# Patient Record
Sex: MALE | Race: WHITE | NOT HISPANIC OR LATINO | Employment: UNEMPLOYED | URBAN - METROPOLITAN AREA
[De-identification: names, ages, dates, MRNs, and addresses within clinical notes are randomized per-mention and may not be internally consistent; named-entity substitution may affect disease eponyms.]

---

## 2024-04-11 ENCOUNTER — HOSPITAL ENCOUNTER (EMERGENCY)
Facility: HOSPITAL | Age: 44
Discharge: HOME | End: 2024-04-12
Attending: EMERGENCY MEDICINE

## 2024-04-11 VITALS
OXYGEN SATURATION: 98 % | DIASTOLIC BLOOD PRESSURE: 84 MMHG | HEART RATE: 85 BPM | RESPIRATION RATE: 18 BRPM | SYSTOLIC BLOOD PRESSURE: 149 MMHG | TEMPERATURE: 98.1 F

## 2024-04-11 DIAGNOSIS — F30.10 MANIC BEHAVIOR (HCC): Primary | ICD-10-CM

## 2024-04-11 PROCEDURE — 99283 EMERGENCY DEPT VISIT LOW MDM: CPT

## 2024-04-12 LAB
ALBUMIN SERPL BCP-MCNC: 4.2 G/DL (ref 3.5–5)
ALP SERPL-CCNC: 40 U/L (ref 34–104)
ALT SERPL W P-5'-P-CCNC: 11 U/L (ref 7–52)
AMPHETAMINES SERPL QL SCN: NEGATIVE
ANION GAP SERPL CALCULATED.3IONS-SCNC: 10 MMOL/L (ref 4–13)
AST SERPL W P-5'-P-CCNC: 17 U/L (ref 13–39)
BARBITURATES UR QL: NEGATIVE
BASOPHILS # BLD AUTO: 0.04 THOUSANDS/ÂΜL (ref 0–0.1)
BASOPHILS NFR BLD AUTO: 0 % (ref 0–1)
BENZODIAZ UR QL: NEGATIVE
BILIRUB SERPL-MCNC: 0.44 MG/DL (ref 0.2–1)
BUN SERPL-MCNC: 14 MG/DL (ref 5–25)
CALCIUM SERPL-MCNC: 9 MG/DL (ref 8.4–10.2)
CHLORIDE SERPL-SCNC: 106 MMOL/L (ref 96–108)
CO2 SERPL-SCNC: 24 MMOL/L (ref 21–32)
COCAINE UR QL: NEGATIVE
CREAT SERPL-MCNC: 0.67 MG/DL (ref 0.6–1.3)
EOSINOPHIL # BLD AUTO: 0.19 THOUSAND/ÂΜL (ref 0–0.61)
EOSINOPHIL NFR BLD AUTO: 2 % (ref 0–6)
ERYTHROCYTE [DISTWIDTH] IN BLOOD BY AUTOMATED COUNT: 12.3 % (ref 11.6–15.1)
ETHANOL SERPL-MCNC: <10 MG/DL
FENTANYL UR QL SCN: NEGATIVE
FLUAV RNA RESP QL NAA+PROBE: NEGATIVE
FLUBV RNA RESP QL NAA+PROBE: NEGATIVE
GFR SERPL CREATININE-BSD FRML MDRD: 116 ML/MIN/1.73SQ M
GLUCOSE SERPL-MCNC: 86 MG/DL (ref 65–140)
HCT VFR BLD AUTO: 40.9 % (ref 36.5–49.3)
HGB BLD-MCNC: 13.1 G/DL (ref 12–17)
HYDROCODONE UR QL SCN: NEGATIVE
IMM GRANULOCYTES # BLD AUTO: 0.07 THOUSAND/UL (ref 0–0.2)
IMM GRANULOCYTES NFR BLD AUTO: 1 % (ref 0–2)
LYMPHOCYTES # BLD AUTO: 4.19 THOUSANDS/ÂΜL (ref 0.6–4.47)
LYMPHOCYTES NFR BLD AUTO: 38 % (ref 14–44)
MCH RBC QN AUTO: 29.3 PG (ref 26.8–34.3)
MCHC RBC AUTO-ENTMCNC: 32 G/DL (ref 31.4–37.4)
MCV RBC AUTO: 92 FL (ref 82–98)
METHADONE UR QL: NEGATIVE
MONOCYTES # BLD AUTO: 1.17 THOUSAND/ÂΜL (ref 0.17–1.22)
MONOCYTES NFR BLD AUTO: 11 % (ref 4–12)
NEUTROPHILS # BLD AUTO: 5.39 THOUSANDS/ÂΜL (ref 1.85–7.62)
NEUTS SEG NFR BLD AUTO: 48 % (ref 43–75)
NRBC BLD AUTO-RTO: 0 /100 WBCS
OPIATES UR QL SCN: NEGATIVE
OXYCODONE+OXYMORPHONE UR QL SCN: NEGATIVE
PCP UR QL: NEGATIVE
PLATELET # BLD AUTO: 264 THOUSANDS/UL (ref 149–390)
PMV BLD AUTO: 11 FL (ref 8.9–12.7)
POTASSIUM SERPL-SCNC: 3.9 MMOL/L (ref 3.5–5.3)
PROT SERPL-MCNC: 6.9 G/DL (ref 6.4–8.4)
RBC # BLD AUTO: 4.47 MILLION/UL (ref 3.88–5.62)
RSV RNA RESP QL NAA+PROBE: NEGATIVE
SARS-COV-2 RNA RESP QL NAA+PROBE: NEGATIVE
SODIUM SERPL-SCNC: 140 MMOL/L (ref 135–147)
THC UR QL: POSITIVE
TSH SERPL DL<=0.05 MIU/L-ACNC: 1.61 UIU/ML (ref 0.45–4.5)
VALPROATE SERPL-MCNC: 81 UG/ML (ref 50–100)
WBC # BLD AUTO: 11.05 THOUSAND/UL (ref 4.31–10.16)

## 2024-04-12 PROCEDURE — 85025 COMPLETE CBC W/AUTO DIFF WBC: CPT | Performed by: EMERGENCY MEDICINE

## 2024-04-12 PROCEDURE — 80164 ASSAY DIPROPYLACETIC ACD TOT: CPT | Performed by: EMERGENCY MEDICINE

## 2024-04-12 PROCEDURE — 99285 EMERGENCY DEPT VISIT HI MDM: CPT | Performed by: EMERGENCY MEDICINE

## 2024-04-12 PROCEDURE — 82077 ASSAY SPEC XCP UR&BREATH IA: CPT | Performed by: EMERGENCY MEDICINE

## 2024-04-12 PROCEDURE — 0241U HB NFCT DS VIR RESP RNA 4 TRGT: CPT | Performed by: EMERGENCY MEDICINE

## 2024-04-12 PROCEDURE — 80053 COMPREHEN METABOLIC PANEL: CPT | Performed by: EMERGENCY MEDICINE

## 2024-04-12 PROCEDURE — 84443 ASSAY THYROID STIM HORMONE: CPT | Performed by: EMERGENCY MEDICINE

## 2024-04-12 PROCEDURE — 80307 DRUG TEST PRSMV CHEM ANLYZR: CPT | Performed by: EMERGENCY MEDICINE

## 2024-04-12 PROCEDURE — 36415 COLL VENOUS BLD VENIPUNCTURE: CPT | Performed by: EMERGENCY MEDICINE

## 2024-04-12 NOTE — ED NOTES
"Pt presents to ED post acting bizarrely in the community this AM taking off his pants and going outside. Pt stated that he is working to alleviate any thoughts of inappropriateness and shame which was his explanation for the event. Pt friend who accompanied him reported that it was a bit of a bigger deal as they live surrounded by others who might call PD. Pt reports that he has been IP @ 4x due to 302's by ex wife. Pt has been  for 10 years. Pt denies any D&A TX. Pt reports that he is not involved w/ behavioral health [place] but would be open to resources. Pt reported that he does not feel IP is helpful and felt that the previous 4x yielded nothing he would consider a positive change in his life. Pt is tangential and bizarre at some points. Pt does not appear to be responding to internal stimuli. Pt denies AV hallucinations or command/persecutory voices. Friend reports that Pt is paranoid and has thoughts of the government and intrusions by them. Pt believes that he is Dx'd ADHD, PTSD, Autistic and not the Bipolar DO that professionals have \"laid at his door.\" Friend reports \"rapid cycling\" consisting of screaming/crying/laughing all in short order. Pt irritable and guarded when talking about meds and adherence to such; it would appear that the individuals he has resided w/ for the last six months assist w/ this. Pt reports ongoing unemployment. Pt reports no constructive behaviors. When asked to describe any depression/anxiety Pt responds \"varies\" or \"no sale.\" Friend states Pt is depressed and has been for months. Pt's attention span is poor. Pt's judgment/insight into his behaviors is also fair/poor. Pt is disheveled/malodorous appearing considerably older than stated age. Pt denies any SI/HI/criminal transgressions/access to firearms. Pt adamant that he is not interested in IP care unless I can \"guarantee\" he will be listened to which this worker could not. Pt vacillated on IP when friend told him something " has to be done or else they will have to look at other living arrangements. Pt somewhat irritable and minimizing but Pt has the wherewithal to decline IP TX at this point. Consulted w/ ED attending, Pt to be given OP resources and encouraged to f/u w/ ED should behaviors worsen.

## 2024-04-12 NOTE — ED PROVIDER NOTES
"History  Chief Complaint   Patient presents with    Medication Problem     Pt states \"I haven't been taking my medicine because I'm a big kid and I'm playing a game.  I know I need them but I like to play.  I like to fuck around so I can find out and be smarter.\"  Pt friends state that pt has been in a period of jeff lately, and when he is like this he stops taking his medications.     Psychiatric Evaluation     43 year old male presents for evaluation of increasingly erratic behavior.  He states that he has not been taking his medications as prescribed for several years and only restarted his levothyroxine 2 days ago at the encouragement of his friend.  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  He is prescribed Depakote for history of grand mal seizures with last seizure 4 months ago.  He states he takes his Depakote once per day instead of twice daily as prescribed.  Patient is currently homeless, but staying with his friend.  She states that he has not been able to sleep and has difficulty caring for himself.  He reportedly ran out of the house naked and had to be escorted back into the home.  Patient denies SI/HI.      Psychiatric Evaluation      None       Past Medical History:   Diagnosis Date    Epilepsy (HCC)     Thyroid cancer (HCC)        Past Surgical History:   Procedure Laterality Date    THYROIDECTOMY      US GUIDED THYROID BIOPSY  04/11/2018       History reviewed. No pertinent family history.  I have reviewed and agree with the history as documented.    E-Cigarette/Vaping    E-Cigarette Use Never User      E-Cigarette/Vaping Substances     Social History     Tobacco Use    Smoking status: Every Day     Current packs/day: 1.50     Types: Cigarettes    Smokeless tobacco: Never   Vaping Use    Vaping status: Never Used   Substance Use Topics    Alcohol use: Yes     Comment: ocassional    Drug use: Yes     Types: Marijuana     Comment: also " mushrooms       Review of Systems    Physical Exam  Physical Exam  Vitals and nursing note reviewed.   HENT:      Head: Normocephalic and atraumatic.   Eyes:      Conjunctiva/sclera: Conjunctivae normal.   Cardiovascular:      Rate and Rhythm: Normal rate and regular rhythm.   Pulmonary:      Effort: Pulmonary effort is normal. No respiratory distress.   Abdominal:      General: There is no distension.   Musculoskeletal:         General: No deformity.   Skin:     General: Skin is warm and dry.   Neurological:      Mental Status: He is alert.         Vital Signs  ED Triage Vitals [04/11/24 1947]   Temperature Pulse Respirations Blood Pressure SpO2   98.1 °F (36.7 °C) 85 18 149/84 98 %      Temp Source Heart Rate Source Patient Position - Orthostatic VS BP Location FiO2 (%)   Temporal Monitor -- Right arm --      Pain Score       No Pain           Vitals:    04/11/24 1947   BP: 149/84   Pulse: 85         Visual Acuity      ED Medications  Medications - No data to display    Diagnostic Studies  Results Reviewed       Procedure Component Value Units Date/Time    TSH, 3rd generation with Free T4 reflex [[id number]]  (Normal) Collected: 04/12/24 0030    Lab Status: Final result Specimen: Blood from Arm, Left Updated: 04/12/24 0134     TSH 3RD GENERATON 1.614 uIU/mL     FLU/RSV/COVID - if FLU/RSV clinically relevant [[id number]]  (Normal) Collected: 04/12/24 0030    Lab Status: Final result Specimen: Nares from Nose Updated: 04/12/24 0114     SARS-CoV-2 Negative     INFLUENZA A PCR Negative     INFLUENZA B PCR Negative     RSV PCR Negative    Narrative:      FOR PEDIATRIC PATIENTS - copy/paste COVID Guidelines URL to browser: [email or web address]    SARS-CoV-2 assay is a Nucleic Acid Amplification assay intended for the  qualitative detection of nucleic acid from SARS-CoV-2 in nasopharyngeal  swabs. Results are for the presumptive identification of SARS-CoV-2  RNA.    Positive results are indicative of infection with SARS-CoV-2, the virus  causing COVID-19, but do not rule out bacterial infection or co-infection  with other viruses. Laboratories within the [place] and its  territories are required to report all positive results to the appropriate  public health authorities. Negative results do not preclude SARS-CoV-2  infection and should not be used as the sole basis for treatment or other  patient management decisions. Negative results must be combined with  clinical observations, patient history, and epidemiological information.  This test has not been FDA cleared or approved.    This test has been authorized by FDA under an Emergency Use Authorization  (EUA). This test is only authorized for the duration of time the  declaration that circumstances exist justifying the authorization of the  emergency use of an in vitro diagnostic tests for detection of SARS-CoV-2  virus and/or diagnosis of COVID-19 infection under section 564(b)(1) of  the Act, 21 U.S.C. 360bbb-3(b)(1), unless the authorization is terminated  or revoked sooner. The test has been validated but independent review by FDA  and CLIA is pending.    Test performed using [employer] Gene[employer]: This RT-PCR assay targets N2,  a region unique to SARS-CoV-2. A conserved region in the E-gene was chosen  for pan-Sarbecovirus detection which includes SARS-CoV-2.    According to CMS-2020-01-R, this platform meets the definition of high-throughput technology.    Comprehensive metabolic panel [[id number]] Collected: 04/12/24 0030    Lab Status: Final result Specimen: Blood from Arm, Left Updated: 04/12/24 0054     Sodium 140 mmol/L      Potassium 3.9 mmol/L      Chloride 106 mmol/L      CO2 24 mmol/L      ANION GAP 10 mmol/L      BUN 14 mg/dL      Creatinine 0.67 mg/dL      Glucose 86 mg/dL      Calcium 9.0 mg/dL      AST 17 U/L      ALT 11 U/L      Alkaline Phosphatase 40 U/L      Total Protein 6.9 g/dL      Albumin  4.2 g/dL      Total Bilirubin 0.44 mg/dL      eGFR 116 ml/min/1.73sq m     Narrative:      National Kidney Disease Foundation guidelines for Chronic Kidney Disease (CKD):     Stage 1 with normal or high GFR (GFR > 90 mL/min/1.73 square meters)    Stage 2 Mild CKD (GFR = 60-89 mL/min/1.73 square meters)    Stage 3A Moderate CKD (GFR = 45-59 mL/min/1.73 square meters)    Stage 3B Moderate CKD (GFR = 30-44 mL/min/1.73 square meters)    Stage 4 Severe CKD (GFR = 15-29 mL/min/1.73 square meters)    Stage 5 End Stage CKD (GFR <15 mL/min/1.73 square meters)  Note: GFR calculation is accurate only with a steady state creatinine    Valproic acid level, total [[id number]]  (Normal) Collected: 04/12/24 0030    Lab Status: Final result Specimen: Blood from Arm, Left Updated: 04/12/24 0054     Valproic Acid, Total 81 ug/mL     Rapid drug screen, urine [[id number]]  (Abnormal) Collected: 04/12/24 0032    Lab Status: Final result Specimen: Urine, Clean Catch Updated: 04/12/24 0053     Amph/Meth UR Negative     Barbiturate Ur Negative     Benzodiazepine Urine Negative     Cocaine Urine Negative     Methadone Urine Negative     Opiate Urine Negative     PCP Ur Negative     THC Urine Positive     Oxycodone Urine Negative     Fentanyl Urine Negative     HYDROCODONE URINE Negative    Narrative:      Presumptive report. If requested, specimen will be sent to reference lab for confirmation.  FOR MEDICAL PURPOSES ONLY.   IF CONFIRMATION NEEDED PLEASE CONTACT THE LAB WITHIN 5 DAYS.    Drug Screen Cutoff Levels:  AMPHETAMINE/METHAMPHETAMINES  1000 ng/mL  BARBITURATES     200 ng/mL  BENZODIAZEPINES     200 ng/mL  COCAINE      300 ng/mL  METHADONE      300 ng/mL  OPIATES      300 ng/mL  PHENCYCLIDINE     25 ng/mL  THC       50 ng/mL  OXYCODONE      100 ng/mL  FENTANYL      5 ng/mL  HYDROCODONE     300 ng/mL    Ethanol [[id number]]  (Normal) Collected: 04/12/24 0030    Lab Status: Final result Specimen: Blood from Arm, Left Updated: 04/12/24 0053      Ethanol Lvl <10 mg/dL     CBC and differential [[id number]]  (Abnormal) Collected: 04/12/24 0030    Lab Status: Final result Specimen: Blood from Arm, Left Updated: 04/12/24 0038     WBC 11.05 Thousand/uL      RBC 4.47 Million/uL      Hemoglobin 13.1 g/dL      Hematocrit 40.9 %      MCV 92 fL      MCH 29.3 pg      MCHC 32.0 g/dL      RDW 12.3 %      MPV 11.0 fL      Platelets 264 Thousands/uL      nRBC 0 /100 WBCs      Segmented % 48 %      Immature Grans % 1 %      Lymphocytes % 38 %      Monocytes % 11 %      Eosinophils Relative 2 %      Basophils Relative 0 %      Absolute Neutrophils 5.39 Thousands/µL      Absolute Immature Grans 0.07 Thousand/uL      Absolute Lymphocytes 4.19 Thousands/µL      Absolute Monocytes 1.17 Thousand/µL      Eosinophils Absolute 0.19 Thousand/µL      Basophils Absolute 0.04 Thousands/µL                    No orders to display              Procedures  Procedures         ED Course                                             Medical Decision Making  43 year old male presents for evaluation of manic behavior and medication noncompliance.  Labs unremarkable.  TSH WNL and depakote in therapeutic range.  Patient medically cleared.  Crisis consulted.  Patient is not interested in inpatient treatment.  No criteria for 302.  Patient provided with outpatient resources by Crisis.  Return precautions provided.    Amount and/or Complexity of Data Reviewed  Labs: ordered.             Disposition  Final diagnoses:   Manic behavior (HCC)     Time reflects when diagnosis was documented in both MDM as applicable and the Disposition within this note       Time User Action Codes Description Comment    4/12/2024  2:21 AM Snehal Flynn Add [F30.10] Manic behavior (HCC)     4/12/2024  2:21 AM Snehal Flynn Add [R41.9,  Z91.148] Medication noncompliance due to cognitive impairment     4/12/2024  2:21 AM Snehal Flynn Remove [R41.9,  Z91.148] Medication noncompliance due to cognitive  impairment     4/12/2024  2:21 AM Snehal Flynn Add [Z91.148] Noncompliance with medication regimen     4/12/2024  2:21 AM Snehal Flynn Remove [Z91.148] Noncompliance with medication regimen           ED Disposition       ED Disposition   Discharge    Condition   Stable    Date/Time   Fri Apr 12, 2024  2:21 AM    Francisca Wilkes discharge to home/self care.                   Follow-up Information       Follow up With Specialties Details Why Contact Info Additional Information    Brice Disla MD Internal Medicine   [address] [address]  [phone]        [facility] Emergency Department Emergency Medicine Go to  If symptoms worsen [address] [address] [phone] [facility] Emergency Department, [address], [place] [address]            Patient's Medications    No medications on file       No discharge procedures on file.    PDMP Review       None            ED Provider  Electronically Signed by             Snehal Flynn MD  04/12/24 0223

## 2024-04-12 NOTE — ED NOTES
This writer discussed the patients current presentation and recommended discharge plan with .  They agree with the patient being discharged at this time with referrals and/or information about OUTPATIENT RESOURCES.     The patient was provided with referral information for:   OUTPATIENT RESOURCES.     This writer and the patient completed a safety plan.  The patient was provided with a copy of their safety plan with encouragement to utilize the plan following discharge.     In addition, the patient was instructed to call [facility] crisis, other crisis services, 911 or to go to the nearest ER immediately if their situation changes at any time.     This writer discussed discharge plans with the patient and friend who agrees with and understands the discharge plans.         SAFETY PLAN  Warning Signs (thoughts, images, mood, behavior, situations) of a potential crisis: ongoing sleeplessness, mood dysregulation continues      Coping Skills (what can I do to take my mind off the problem, or to keep myself safe): keep busy      Outside Support (who can I reach out to for support and help): friends        [place] Suicide Prevention Hotline:  988      [facility] [phone] - [facility] [phone] - [facility] Crisis/Mobile Crisis   [phone] - [facility] Crisis   [facility]: [phone]  [facility]: [phone]   [facility] [place] [phone] - Crisis   [facility] [phone] - Crisis     [facility] [phone] - Crisis   [facility] [phone] - Crisis   [phone] - Peer Support Talk Line (1-9pm daily)  [phone] - Teen Support Talk Line (1-9pm daily)  [phone] - [facility] [phone]- Crisis    [facility] [phone] - Crisis   [facility] [phone] - Crisis    [facility]) [phone] - [facility] Crisis